# Patient Record
Sex: FEMALE | Race: WHITE | Employment: STUDENT | URBAN - METROPOLITAN AREA
[De-identification: names, ages, dates, MRNs, and addresses within clinical notes are randomized per-mention and may not be internally consistent; named-entity substitution may affect disease eponyms.]

---
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
## 2023-02-22 SDOH — ECONOMIC STABILITY: FOOD INSECURITY: WITHIN THE PAST 12 MONTHS, THE FOOD YOU BOUGHT JUST DIDN'T LAST AND YOU DIDN'T HAVE MONEY TO GET MORE.: NEVER TRUE

## 2023-02-22 SDOH — ECONOMIC STABILITY: INCOME INSECURITY: HOW HARD IS IT FOR YOU TO PAY FOR THE VERY BASICS LIKE FOOD, HOUSING, MEDICAL CARE, AND HEATING?: NOT VERY HARD

## 2023-02-22 SDOH — ECONOMIC STABILITY: HOUSING INSECURITY
IN THE LAST 12 MONTHS, WAS THERE A TIME WHEN YOU DID NOT HAVE A STEADY PLACE TO SLEEP OR SLEPT IN A SHELTER (INCLUDING NOW)?: NO

## 2023-02-22 SDOH — ECONOMIC STABILITY: FOOD INSECURITY: WITHIN THE PAST 12 MONTHS, YOU WORRIED THAT YOUR FOOD WOULD RUN OUT BEFORE YOU GOT MONEY TO BUY MORE.: NEVER TRUE

## 2023-02-22 SDOH — ECONOMIC STABILITY: TRANSPORTATION INSECURITY
IN THE PAST 12 MONTHS, HAS LACK OF TRANSPORTATION KEPT YOU FROM MEETINGS, WORK, OR FROM GETTING THINGS NEEDED FOR DAILY LIVING?: NO

## 2023-02-24 ENCOUNTER — OFFICE VISIT (OUTPATIENT)
Dept: OBGYN CLINIC | Age: 18
End: 2023-02-24
Payer: COMMERCIAL

## 2023-02-24 VITALS — SYSTOLIC BLOOD PRESSURE: 118 MMHG | BODY MASS INDEX: 31.15 KG/M2 | WEIGHT: 193 LBS | DIASTOLIC BLOOD PRESSURE: 72 MMHG

## 2023-02-24 DIAGNOSIS — N92.0 MENORRHAGIA WITH REGULAR CYCLE: Primary | ICD-10-CM

## 2023-02-24 DIAGNOSIS — Z30.42 ENCOUNTER FOR MANAGEMENT AND INJECTION OF DEPO-PROVERA: ICD-10-CM

## 2023-02-24 PROCEDURE — 96372 THER/PROPH/DIAG INJ SC/IM: CPT | Performed by: ADVANCED PRACTICE MIDWIFE

## 2023-02-24 PROCEDURE — 99213 OFFICE O/P EST LOW 20 MIN: CPT | Performed by: ADVANCED PRACTICE MIDWIFE

## 2023-02-24 RX ORDER — MEDROXYPROGESTERONE ACETATE 150 MG/ML
150 INJECTION, SUSPENSION INTRAMUSCULAR
Qty: 1 ML | Refills: 3 | Status: SHIPPED | OUTPATIENT
Start: 2023-02-24

## 2023-02-24 RX ORDER — MEDROXYPROGESTERONE ACETATE 150 MG/ML
150 INJECTION, SUSPENSION INTRAMUSCULAR ONCE
Status: COMPLETED | OUTPATIENT
Start: 2023-02-24 | End: 2023-02-24

## 2023-02-24 RX ADMIN — MEDROXYPROGESTERONE ACETATE 150 MG: 150 INJECTION, SUSPENSION INTRAMUSCULAR at 13:20

## 2023-02-24 ASSESSMENT — ENCOUNTER SYMPTOMS
VOMITING: 0
ABDOMINAL PAIN: 0
NAUSEA: 0
SHORTNESS OF BREATH: 0
DIARRHEA: 0

## 2023-02-24 NOTE — PROGRESS NOTES
[facility],[address] OB[address]GYN [address]  [facility]  [address] [address]  Dept: [phone]    Patient Name: Eduar Whyte  Patient Age: 21 y.o. Date of Visit: 2023    Subjective  Chief Complaint   Patient presents with    Medication Check     Patient's last menstrual period was 2023. HPI    Arrives for 3 month medication check on depo provera. Reports very happy with method and having minimal vaginal bleeding. Would like to continue with depo. No other concerns today.     PHQ Scores 2022 6/3/2022   PHQ2 Score 0 0   PHQ9 Score 0 0     Interpretation of Total Score Depression Severity: 1-4 = Minimal depression, 5-9 = Mild depression, 10-14 = Moderate depression, 15-19 = Moderately severe depression, 20-27 = Severe depression      OB History          0    Para   0    Term   0       0    AB   0    Living   0         SAB   0    IAB   0    Ectopic   0    Molar   0    Multiple   0    Live Births   0              Past Medical History:   Diagnosis Date    40 weeks gestation of pregnancy     VAGINAL BIRTH, BIRTH WT 8 LB 9 OZ, NON COMPLICATIONS AT BIRTH    Abdominal pain     CONSTANT    Difficult intravenous access     VEINS HARD TO FIND    Headache     Vomiting     OCCASSIONAL     Current Outpatient Medications   Medication Sig Dispense Refill    medroxyPROGESTERone (DEPO-PROVERA) 150 MG/ML injection Inject 1 mL into the muscle every 3 months 1 mL 3    loratadine (CLARITIN) 10 MG tablet Take 1 tablet by mouth daily 30 tablet 1    butalbital-acetaminophen-caffeine (FIORICET, ESGIC) -40 MG per tablet Take 1 tablet by mouth every 4 hours as needed for Headaches or Migraine 30 tablet 1    ondansetron (ZOFRAN) 4 MG tablet Take 1 tablet by mouth every 12 hours as needed for Nausea or Vomiting 30 tablet 1    acetaminophen (TYLENOL) 500 MG tablet Take 750 mg by mouth every 6 hours as needed for Pain       No current facility-administered medications for this visit. Review of Systems   Constitutional:  Negative for unexpected weight change. Respiratory:  Negative for shortness of breath. Cardiovascular:  Negative for chest pain, palpitations and leg swelling. Gastrointestinal:  Negative for abdominal pain, diarrhea, nausea and vomiting. Genitourinary:  Negative for difficulty urinating, dyspareunia, menstrual problem, vaginal discharge and vaginal pain. Neurological:  Negative for dizziness, light-headedness and headaches. Objective  /72 (Site: Left Upper Arm, Position: Sitting, Cuff Size: Medium Adult)   Wt 193 lb (87.5 kg)   LMP 02/24/2023   BMI 31.15 kg/m²     Physical Exam  Vitals reviewed. Constitutional:       General: She is not in acute distress. Appearance: She is well-developed. She is not diaphoretic. Neck:      Thyroid: No thyromegaly or thyroid tenderness. Cardiovascular:      Rate and Rhythm: Normal rate and regular rhythm. Heart sounds: Normal heart sounds. Pulmonary:      Effort: Pulmonary effort is normal. No respiratory distress. Breath sounds: Normal breath sounds. Abdominal:      General: There is no distension. Palpations: Abdomen is soft. Tenderness: There is no abdominal tenderness. Musculoskeletal:         General: Normal range of motion. Cervical back: Normal range of motion. Skin:     General: Skin is warm and dry. Neurological:      Mental Status: She is alert and oriented to person, place, and time. Mental status is at baseline. Psychiatric:         Behavior: Behavior normal.         Thought Content: Thought content normal.         Judgment: Judgment normal.         Assessment & Plan  1.  Menorrhagia with regular cycle  Discussed the advantages of Depo include no estrogen, depo provides 3 months of highly effective contraception per injection and requires infrequent dosing, reduced risk of ectopic pregnancy, potential absence of menstrual bleeding with 40-50% of women being amenorrhea after four injections and approximately 80% of women by the fifth injection. Depo is associated with a decrease in ovulation pain, cyclic menstrual cramps, mood changes, headaches, breast tenderness, and nausea. Depo has minimal drug interactions and a reports fewer seizures with patients who are known to have seizures prior to administration. Depo users who have sickle cell disease have fewer crises. Depo is shown to reduce pain associated with endometriosis and effectively decreases the bleeding associated with myomas. Depo also decreases the risk of endometrial cancer. Disadvantages including menstrual cycle disturbances with bleed patterns being unpredictable, weight gain, not possible to discontinue immediately, and a delay in return of fertility which may be a delay to conception up to 9 to 10 months after last injection. Reviewed black box warning for use no recommended over 2 years due to increase risk of osteoporosis. Reviewed the method for measuring for BMD (bone mineral density) has not been validated in adolescents as a marker of future fractures. In 2005 the [employer] did a technical consultation on the effects of hormonal contraception on bone health. They concluded that there should be no restriction on the use of DMPA in women aged 18-45 years, including no restriction on duration of use. Reviewed no high-quality data answer whether DMPA affects fracture risk in adolescents or adults later in life. Reviewed depo can be administered if patient is currently on her menses, switching over from another method and is using reliably, and if it has been greater than 2 weeks since last unprotected sexual encounter. Reviewed if depo is given any time other than on her menses she needs to use back up protected for 7 days after injection. Reviewed with patient that she needs to schedule for an injection every 11- 13 weeks.   - [place] THERAPEUTIC PROPHYLACTIC/DX INJECTION SUBQ/IM  - medroxyPROGESTERone (DEPO-PROVERA) injection 150 mg  - medroxyPROGESTERone (DEPO-PROVERA) 150 MG/ML injection; Inject 1 mL into the muscle every 3 months  Dispense: 1 mL; Refill: 3    2. Encounter for management and injection of depo-Provera  - [place] THERAPEUTIC PROPHYLACTIC/DX INJECTION SUBQ/IM  - medroxyPROGESTERone (DEPO-PROVERA) injection 150 mg  - medroxyPROGESTERone (DEPO-PROVERA) 150 MG/ML injection; Inject 1 mL into the muscle every 3 months  Dispense: 1 mL; Refill: 3      The patient, Daly Jiang , was seen with a total time spent of 25 minutes for the visit on this date of service by the [facility]  Both face-to-face (counseling and education) and non face-to-face time (care coordination), were spent in determining the total time component. Return in about 3 months (around 5/24/2023) for depo.     Electronically Signed BONY Cobian CNM

## 2023-05-04 ENCOUNTER — OFFICE VISIT (OUTPATIENT)
Dept: PRIMARY CARE CLINIC | Age: 18
End: 2023-05-04
Payer: COMMERCIAL

## 2023-05-04 VITALS
OXYGEN SATURATION: 100 % | HEART RATE: 121 BPM | SYSTOLIC BLOOD PRESSURE: 134 MMHG | BODY MASS INDEX: 32.54 KG/M2 | DIASTOLIC BLOOD PRESSURE: 78 MMHG | RESPIRATION RATE: 16 BRPM | WEIGHT: 201.6 LBS

## 2023-05-04 DIAGNOSIS — L20.9 ATOPIC DERMATITIS, MILD: ICD-10-CM

## 2023-05-04 DIAGNOSIS — G56.03 CARPAL TUNNEL SYNDROME, BILATERAL: ICD-10-CM

## 2023-05-04 DIAGNOSIS — G43.901 MIGRAINE WITH STATUS MIGRAINOSUS, NOT INTRACTABLE, UNSPECIFIED MIGRAINE TYPE: Primary | ICD-10-CM

## 2023-05-04 PROCEDURE — 99214 OFFICE O/P EST MOD 30 MIN: CPT | Performed by: NURSE PRACTITIONER

## 2023-05-04 RX ORDER — BUTALBITAL, ACETAMINOPHEN AND CAFFEINE 50; 325; 40 MG/1; MG/1; MG/1
1 TABLET ORAL EVERY 4 HOURS PRN
Qty: 30 TABLET | Refills: 1 | Status: SHIPPED | OUTPATIENT
Start: 2023-05-04

## 2023-05-04 RX ORDER — PREDNISONE 20 MG/1
20 TABLET ORAL DAILY
Qty: 5 TABLET | Refills: 0 | Status: SHIPPED | OUTPATIENT
Start: 2023-05-04 | End: 2023-05-09

## 2023-05-04 RX ORDER — CLOBETASOL PROPIONATE 0.5 MG/G
OINTMENT TOPICAL
Qty: 15 G | Refills: 1 | Status: SHIPPED | OUTPATIENT
Start: 2023-05-04

## 2023-05-04 ASSESSMENT — ENCOUNTER SYMPTOMS
ABDOMINAL PAIN: 0
DIARRHEA: 0
NAUSEA: 0
SHORTNESS OF BREATH: 0
RHINORRHEA: 0
COLOR CHANGE: 0
VOMITING: 0
CHEST TIGHTNESS: 0
SORE THROAT: 0

## 2023-05-04 ASSESSMENT — PATIENT HEALTH QUESTIONNAIRE - PHQ9
SUM OF ALL RESPONSES TO PHQ QUESTIONS 1-9: 0
SUM OF ALL RESPONSES TO PHQ QUESTIONS 1-9: 0
2. FEELING DOWN, DEPRESSED OR HOPELESS: 0
SUM OF ALL RESPONSES TO PHQ QUESTIONS 1-9: 0
1. LITTLE INTEREST OR PLEASURE IN DOING THINGS: 0
SUM OF ALL RESPONSES TO PHQ QUESTIONS 1-9: 0
SUM OF ALL RESPONSES TO PHQ9 QUESTIONS 1 & 2: 0

## 2023-05-04 NOTE — PROGRESS NOTES
[address] PRIMARY CARE  [address]   [address]  [address] [address]  Dept: [phone]  Dept Fax: [phone]    Erik Fernández is a 21 y.o. female who presentstoday for her medical conditions/complaints as noted below.   Erik Fernández is c/o of  Chief Complaint   Patient presents with    Hand Pain     Radiating into wrist; bilateral since November 2022    Skin Problem     Spots on skin since January 2023; itches a little         HPI:     Here with acute complaint of bilateral carpal tunnel syndrome, has been using bilateral wrist braces at night without significant improvement  She feels they may be too soft  Works from home and is on computer most of day, feels this is contributing to symptoms     Has red and raised spots on both feet, no improvement with antifungal topicals  Denies itching or pain, they are getting larger over the last 3-4 months  No new exposures, no hx eczema or allergies     Migraine well controlled with fioricet, needs refill       No results found for: LABA1C          ( goal A1C is < 7)   No results found for: LABMICR  LDL Cholesterol (mg/dL)   Date Value   06/03/2022 156 (H)       (goal LDL is <100)   AST (U/L)   Date Value   06/03/2022 22     ALT (U/L)   Date Value   06/03/2022 21     BUN (mg/dL)   Date Value   06/03/2022 7     BP Readings from Last 3 Encounters:   05/04/23 134/78   02/24/23 118/72   12/08/22 120/78          ([id number])    Past Medical History:   Diagnosis Date    40 weeks gestation of pregnancy [birth year]    VAGINAL BIRTH, BIRTH WT 8 LB 9 OZ, NON COMPLICATIONS AT BIRTH    Abdominal pain     CONSTANT    Difficult intravenous access     VEINS HARD TO FIND    Headache     Hypertension     Vomiting     OCCASSIONAL      Past Surgical History:   Procedure Laterality Date    COLONOSCOPY  04/20/2017    IN COLONOSCOPY W/BIOPSY SINGLE/MULTIPLE N/A 04/20/2017    COLONOSCOPY WITH BIOPSY performed by Patt Eaton MD at [address] [facility]

## 2023-05-19 ENCOUNTER — NURSE ONLY (OUTPATIENT)
Dept: OBGYN CLINIC | Age: 18
End: 2023-05-19

## 2023-05-19 VITALS — SYSTOLIC BLOOD PRESSURE: 112 MMHG | DIASTOLIC BLOOD PRESSURE: 74 MMHG | WEIGHT: 199 LBS | BODY MASS INDEX: 32.12 KG/M2

## 2023-05-19 DIAGNOSIS — N92.0 MENORRHAGIA WITH REGULAR CYCLE: Primary | ICD-10-CM

## 2023-05-19 RX ORDER — MEDROXYPROGESTERONE ACETATE 150 MG/ML
150 INJECTION, SUSPENSION INTRAMUSCULAR ONCE
Status: COMPLETED | OUTPATIENT
Start: 2023-05-19 | End: 2023-05-19

## 2023-05-19 RX ADMIN — MEDROXYPROGESTERONE ACETATE 150 MG: 150 INJECTION, SUSPENSION INTRAMUSCULAR at 12:09

## 2023-06-23 ENCOUNTER — PATIENT MESSAGE (OUTPATIENT)
Dept: PRIMARY CARE CLINIC | Age: 18
End: 2023-06-23

## 2023-06-23 NOTE — TELEPHONE ENCOUNTER
From: Harmeet Gipson  To: Lynn Meyer  Sent: 6/23/2023 3:28 PM EDT  Subject: Anxiety Medication     Hi Trino Jaimes had struggles with anxiety for a long time and met with a counselor and discussed the possibility of medication. She recommended reaching out to my primary care doctor to consider a prescription for a low-dose anxiety medication, as that is something I would rather try first before trying to get into a psychiatrist. Is that something we would be able to discuss and address over a telehealth appointment or would I have to come into the office? Or is that not an option at all?     Thanks

## 2023-08-11 ENCOUNTER — NURSE ONLY (OUTPATIENT)
Dept: OBGYN CLINIC | Age: 18
End: 2023-08-11

## 2023-08-11 VITALS
WEIGHT: 202 LBS | HEIGHT: 66 IN | BODY MASS INDEX: 32.47 KG/M2 | SYSTOLIC BLOOD PRESSURE: 118 MMHG | DIASTOLIC BLOOD PRESSURE: 74 MMHG

## 2023-08-11 DIAGNOSIS — N92.0 MENORRHAGIA WITH REGULAR CYCLE: Primary | ICD-10-CM

## 2023-08-11 RX ORDER — MEDROXYPROGESTERONE ACETATE 150 MG/ML
150 INJECTION, SUSPENSION INTRAMUSCULAR ONCE
Status: COMPLETED | OUTPATIENT
Start: 2023-08-11 | End: 2023-08-11

## 2023-08-11 RX ADMIN — MEDROXYPROGESTERONE ACETATE 150 MG: 150 INJECTION, SUSPENSION INTRAMUSCULAR at 10:16

## 2023-08-11 NOTE — PROGRESS NOTES
Pt here for depo provera injection. Given  mg in her left vastus lateralis. Pt tolerated well and will return to the office in 12 weeks. Lot # [id number]  Exp: 9/2025     Dx:  Menorrhagia with regular cycle

## 2023-10-23 DIAGNOSIS — G43.901 MIGRAINE WITH STATUS MIGRAINOSUS, NOT INTRACTABLE, UNSPECIFIED MIGRAINE TYPE: ICD-10-CM

## 2023-10-23 RX ORDER — BUTALBITAL, ACETAMINOPHEN AND CAFFEINE 50; 325; 40 MG/1; MG/1; MG/1
1 TABLET ORAL EVERY 4 HOURS PRN
Qty: 30 TABLET | Refills: 1 | Status: SHIPPED | OUTPATIENT
Start: 2023-10-23

## 2023-10-28 DIAGNOSIS — N92.0 MENORRHAGIA WITH REGULAR CYCLE: ICD-10-CM

## 2023-10-28 DIAGNOSIS — Z30.42 ENCOUNTER FOR MANAGEMENT AND INJECTION OF DEPO-PROVERA: ICD-10-CM

## 2023-10-30 NOTE — TELEPHONE ENCOUNTER
Left message to inform pt will be due for yearly exam on or after 12/8 can try to schedule her next depo with her yearly med check.

## 2023-11-01 RX ORDER — MEDROXYPROGESTERONE ACETATE 150 MG/ML
150 INJECTION, SUSPENSION INTRAMUSCULAR
Qty: 1 ML | Refills: 0 | Status: SHIPPED | OUTPATIENT
Start: 2023-11-01

## 2023-11-03 ENCOUNTER — NURSE ONLY (OUTPATIENT)
Dept: OBGYN CLINIC | Age: 18
End: 2023-11-03

## 2023-11-03 VITALS — SYSTOLIC BLOOD PRESSURE: 120 MMHG | DIASTOLIC BLOOD PRESSURE: 78 MMHG

## 2023-11-03 DIAGNOSIS — N92.0 MENORRHAGIA WITH REGULAR CYCLE: Primary | ICD-10-CM

## 2023-11-03 RX ORDER — MEDROXYPROGESTERONE ACETATE 150 MG/ML
150 INJECTION, SUSPENSION INTRAMUSCULAR ONCE
Status: COMPLETED | OUTPATIENT
Start: 2023-11-03 | End: 2023-11-03

## 2023-11-03 RX ADMIN — MEDROXYPROGESTERONE ACETATE 150 MG: 150 INJECTION, SUSPENSION INTRAMUSCULAR at 10:16

## 2023-11-03 NOTE — PROGRESS NOTES
Depo given right vastus lateralis, no concerns. Documented in MAR tab. Menorrhagia with regular cycle, will make next appt on her way out with annual med check and depo in 12 weeks.     JOSAFAT

## 2024-01-16 ENCOUNTER — TELEMEDICINE (OUTPATIENT)
Dept: PRIMARY CARE CLINIC | Age: 19
End: 2024-01-16
Payer: COMMERCIAL

## 2024-01-16 DIAGNOSIS — R59.0 CERVICAL LYMPHADENOPATHY: ICD-10-CM

## 2024-01-16 DIAGNOSIS — R59.0 PREAURICULAR ADENOPATHY: ICD-10-CM

## 2024-01-16 DIAGNOSIS — H92.02 LEFT EAR PAIN: Primary | ICD-10-CM

## 2024-01-16 PROCEDURE — 99214 OFFICE O/P EST MOD 30 MIN: CPT | Performed by: NURSE PRACTITIONER

## 2024-01-16 RX ORDER — PREDNISONE 20 MG/1
20 TABLET ORAL DAILY
Qty: 5 TABLET | Refills: 0 | Status: SHIPPED | OUTPATIENT
Start: 2024-01-16 | End: 2024-01-21

## 2024-01-16 RX ORDER — AMOXICILLIN AND CLAVULANATE POTASSIUM 875; 125 MG/1; MG/1
1 TABLET, FILM COATED ORAL 2 TIMES DAILY
Qty: 14 TABLET | Refills: 0 | Status: SHIPPED | OUTPATIENT
Start: 2024-01-16 | End: 2024-01-23

## 2024-01-16 ASSESSMENT — ENCOUNTER SYMPTOMS
SORE THROAT: 0
ABDOMINAL PAIN: 0
TROUBLE SWALLOWING: 0
COLOR CHANGE: 0
RHINORRHEA: 0
SHORTNESS OF BREATH: 0
NAUSEA: 0
CHEST TIGHTNESS: 0
SINUS PAIN: 0
SINUS PRESSURE: 0
VOMITING: 0
DIARRHEA: 0

## 2024-01-16 NOTE — PROGRESS NOTES
2024    TELEHEALTH EVALUATION -- Audio/Visual    HPI:    Avis Killian (:  2005) has requested an audio/video evaluation for the following concern(s):    Presents with c/o swollen left side of face in front of left ear and behind left ear with some shooting pain into left ear  She is at [facility] in [place] so cannot easily get into office for exam   She denies tooth or dental pain, no throat pain or swelling, no difficulty breathing or swallowing   Pain and swelling have been present for about 4 days  She points to swelling and pain in pre and post auricular lymph nodes and from a side view, some swelling is noted  We discussed limitations of VV for exam, ideally would look in mouth and inner and ear and also palpate areas of concern  She denies fever or chills, no sinus congestion, slight headache  Denies other associated symptoms     Review of Systems   Constitutional:  Negative for activity change, fatigue and fever.   HENT:  Positive for ear pain (Left). Negative for congestion, rhinorrhea, sinus pressure, sinus pain, sore throat, tinnitus and trouble swallowing.    Eyes:  Negative for visual disturbance.   Respiratory:  Negative for chest tightness and shortness of breath.    Cardiovascular:  Negative for chest pain and palpitations.   Gastrointestinal:  Negative for abdominal pain, diarrhea, nausea and vomiting.   Endocrine: Negative for polydipsia.   Genitourinary:  Negative for difficulty urinating.   Musculoskeletal:  Negative for arthralgias and myalgias.   Skin:  Negative for color change.   Neurological:  Positive for headaches. Negative for weakness.   Psychiatric/Behavioral:  Negative for behavioral problems. The patient is not nervous/anxious.    All other systems reviewed and are negative.      Prior to Visit Medications    Medication Sig Taking? Authorizing Provider   predniSONE (DELTASONE) 20 MG tablet Take 1 tablet by mouth daily for 5 days Yes Natividad Vazquez, APRN - CNP

## 2024-01-19 DIAGNOSIS — N92.0 MENORRHAGIA WITH REGULAR CYCLE: ICD-10-CM

## 2024-01-19 DIAGNOSIS — Z30.42 ENCOUNTER FOR MANAGEMENT AND INJECTION OF DEPO-PROVERA: ICD-10-CM

## 2024-01-19 RX ORDER — MEDROXYPROGESTERONE ACETATE 150 MG/ML
150 INJECTION, SUSPENSION INTRAMUSCULAR
Qty: 1 ML | Refills: 0 | Status: SHIPPED | OUTPATIENT
Start: 2024-01-19

## 2024-01-19 NOTE — TELEPHONE ENCOUNTER
Pt is scheduled for her annual med ck on 2/2. She needs a refill so she can get her next depo that day.

## 2024-02-02 ENCOUNTER — OFFICE VISIT (OUTPATIENT)
Dept: OBGYN CLINIC | Age: 19
End: 2024-02-02
Payer: COMMERCIAL

## 2024-02-02 VITALS
HEIGHT: 66 IN | SYSTOLIC BLOOD PRESSURE: 126 MMHG | BODY MASS INDEX: 34.55 KG/M2 | DIASTOLIC BLOOD PRESSURE: 72 MMHG | WEIGHT: 215 LBS

## 2024-02-02 DIAGNOSIS — Z01.419 WELL WOMAN EXAM: Primary | ICD-10-CM

## 2024-02-02 DIAGNOSIS — N92.0 MENORRHAGIA WITH REGULAR CYCLE: ICD-10-CM

## 2024-02-02 PROCEDURE — 99395 PREV VISIT EST AGE 18-39: CPT | Performed by: ADVANCED PRACTICE MIDWIFE

## 2024-02-02 RX ORDER — MEDROXYPROGESTERONE ACETATE 150 MG/ML
150 INJECTION, SUSPENSION INTRAMUSCULAR
Qty: 1 ML | Refills: 3 | Status: SHIPPED | OUTPATIENT
Start: 2024-02-02

## 2024-02-02 ASSESSMENT — ENCOUNTER SYMPTOMS
ABDOMINAL PAIN: 0
DIARRHEA: 0
NAUSEA: 0
SHORTNESS OF BREATH: 0
VOMITING: 0

## 2024-02-02 ASSESSMENT — PATIENT HEALTH QUESTIONNAIRE - PHQ9
SUM OF ALL RESPONSES TO PHQ9 QUESTIONS 1 & 2: 0
1. LITTLE INTEREST OR PLEASURE IN DOING THINGS: 0
SUM OF ALL RESPONSES TO PHQ QUESTIONS 1-9: 0

## 2024-02-02 NOTE — PROGRESS NOTES
Chaperone for Intimate Exam  Chaperone was offered as part of the rooming process. Patient declined and agrees to continue with exam without a chaperone.  Chaperone: n/a    Patient received depo in left deltoid patient tolerated well. Patient supplied medication. Lot [id number] EXP:1/31/2026  
encounter. Reviewed if depo is given any time other than on her menses she needs to use back up protected for 7 days after injection. Reviewed with patient that she needs to schedule for an injection every 11- 13 weeks.  - medroxyPROGESTERone (DEPO-PROVERA) 150 MG/ML injection; Inject 1 mL into the muscle every 3 months  Dispense: 1 mL; Refill: 3  - [place] THERAPEUTIC PROPHYLACTIC/DX INJECTION SUBQ/IM    Return in about 1 year (around 2/2/2025), or if symptoms worsen or fail to improve, for Annual.     The patient, Avis Killian , was seen with a total time spent of 25 minutes for the [facility]  The time component, involved both face-to-face (counseling and education)  and non face-to-face time (care coordination), spent in determining the total time component.    She was also counseled on her preventative health maintenance recommendations and follow-up.    Electronically Signed by BONY Reyes CNM

## 2024-04-30 DIAGNOSIS — N92.0 MENORRHAGIA WITH REGULAR CYCLE: Primary | ICD-10-CM

## 2024-04-30 RX ORDER — MEDROXYPROGESTERONE ACETATE 150 MG/ML
150 INJECTION, SUSPENSION INTRAMUSCULAR ONCE
Qty: 1 ML | Refills: 0
Start: 2024-04-30 | End: 2024-04-30

## 2024-04-30 NOTE — TELEPHONE ENCOUNTER
Pt left depo in [place].    Medication Request/Refill Telephone Documentation:  Medication Requested: DEPO   Provider last filled by: DUSTIN  Last visit: 2/2/24  Last annual/pap smear: N/A  NEXT APPT:5/3/24

## 2024-05-03 ENCOUNTER — NURSE ONLY (OUTPATIENT)
Dept: OBGYN CLINIC | Age: 19
End: 2024-05-03
Payer: COMMERCIAL

## 2024-05-03 VITALS
BODY MASS INDEX: 34.81 KG/M2 | HEART RATE: 80 BPM | DIASTOLIC BLOOD PRESSURE: 72 MMHG | WEIGHT: 216.6 LBS | SYSTOLIC BLOOD PRESSURE: 110 MMHG | HEIGHT: 66 IN

## 2024-05-03 DIAGNOSIS — N92.0 MENORRHAGIA WITH REGULAR CYCLE: Primary | ICD-10-CM

## 2024-05-03 PROCEDURE — 99211 OFF/OP EST MAY X REQ PHY/QHP: CPT | Performed by: ADVANCED PRACTICE MIDWIFE

## 2024-05-03 RX ORDER — MEDROXYPROGESTERONE ACETATE 150 MG/ML
150 INJECTION, SUSPENSION INTRAMUSCULAR ONCE
Status: COMPLETED | OUTPATIENT
Start: 2024-05-03 | End: 2024-05-03

## 2024-05-03 RX ADMIN — MEDROXYPROGESTERONE ACETATE 150 MG: 150 INJECTION, SUSPENSION INTRAMUSCULAR at 09:15

## 2024-05-03 NOTE — PROGRESS NOTES
Avis is here for Depo Provera 150 mg. Patient denies any irregular bleeding or spotting, depression or hair loss.  Calcium education given. Given Intramuscular, Right deltoid. Lot Number: [id number]  EXP: 3/2026  Diagnosis: menorrhagia with reg cycle  Patient tolerated well and is to return to office in 12 weeks.

## 2024-10-30 DIAGNOSIS — N92.0 MENORRHAGIA WITH REGULAR CYCLE: ICD-10-CM

## 2024-10-30 NOTE — TELEPHONE ENCOUNTER
Medication Request/Refill Telephone Documentation:  Medication Requested: depo provera   Provider last filled by: bry  Last visit: 7/26/24 in [place] in [facility]  Last annual/pap smear: n/a  NEXT APPT: 11/1/24

## 2024-10-31 RX ORDER — MEDROXYPROGESTERONE ACETATE 150 MG/ML
150 INJECTION, SUSPENSION INTRAMUSCULAR ONCE
Qty: 1 ML | Refills: 0 | Status: SHIPPED | OUTPATIENT
Start: 2024-10-31 | End: 2024-10-31

## 2025-02-05 ENCOUNTER — TELEPHONE (OUTPATIENT)
Dept: OBGYN CLINIC | Age: 20
End: 2025-02-05

## (undated) DEVICE — GLOVE ORANGE PI 8 1/2   MSG9085

## (undated) DEVICE — Device: Brand: DISPOSABLE BIOPSY FORCEPS

## (undated) DEVICE — FORCEPS BX L240CM JAW DIA2.2MM RAD JAW 4 HOT DISP